# Patient Record
(demographics unavailable — no encounter records)

---

## 2024-12-24 NOTE — PHYSICAL EXAM
[Normal] : mucosa is normal [Midline] : trachea located in midline position [de-identified] : ABOUT 3MM POST INFERIOR CENTRAL PERF WITH TM IRRITATION

## 2024-12-24 NOTE — REVIEW OF SYSTEMS
[Post Nasal Drip] : post nasal drip [Ear Pain] : ear pain [Ear Itch] : ear itch [Hearing Loss] : hearing loss [Sinus Pain] : sinus pain [Sinus Pressure] : sinus pressure [Negative] : Heme/Lymph [Patient Intake Form Reviewed] : Patient intake form was reviewed no fever

## 2024-12-24 NOTE — ASSESSMENT
[FreeTextEntry1] :  RHETT MODERATE SNHL  LEFT MORE THAN RIGHT WITH CONDUCTIVE COMPONENT HEARING AIDS DISCUSSED WATER PERCAUTIONS F/U 2 WEEKS

## 2025-01-15 NOTE — HISTORY OF PRESENT ILLNESS
[de-identified] : lfet ear drum punctured with q tips follow up feeling better medical hx reviewed

## 2025-01-15 NOTE — PHYSICAL EXAM
[de-identified] : healed left tm [Normal] : mucosa is normal [Midline] : trachea located in midline position